# Patient Record
Sex: MALE | Race: WHITE | Employment: FULL TIME | URBAN - NONMETROPOLITAN AREA
[De-identification: names, ages, dates, MRNs, and addresses within clinical notes are randomized per-mention and may not be internally consistent; named-entity substitution may affect disease eponyms.]

---

## 2023-01-12 ENCOUNTER — OFFICE VISIT (OUTPATIENT)
Dept: FAMILY MEDICINE CLINIC | Age: 38
End: 2023-01-12
Payer: COMMERCIAL

## 2023-01-12 ENCOUNTER — NURSE TRIAGE (OUTPATIENT)
Dept: OTHER | Facility: CLINIC | Age: 38
End: 2023-01-12

## 2023-01-12 ENCOUNTER — TELEPHONE (OUTPATIENT)
Dept: FAMILY MEDICINE CLINIC | Age: 38
End: 2023-01-12

## 2023-01-12 VITALS
BODY MASS INDEX: 35.83 KG/M2 | HEART RATE: 92 BPM | DIASTOLIC BLOOD PRESSURE: 72 MMHG | HEIGHT: 74 IN | TEMPERATURE: 98.9 F | OXYGEN SATURATION: 96 % | WEIGHT: 279.2 LBS | SYSTOLIC BLOOD PRESSURE: 130 MMHG | RESPIRATION RATE: 16 BRPM

## 2023-01-12 DIAGNOSIS — F17.200 SMOKES: ICD-10-CM

## 2023-01-12 DIAGNOSIS — Z00.00 ENCOUNTER FOR MEDICAL EXAMINATION TO ESTABLISH CARE: Primary | ICD-10-CM

## 2023-01-12 DIAGNOSIS — G56.03 BILATERAL CARPAL TUNNEL SYNDROME: ICD-10-CM

## 2023-01-12 DIAGNOSIS — R20.0 NUMBNESS OF FINGERS OF BOTH HANDS: ICD-10-CM

## 2023-01-12 DIAGNOSIS — R20.0 NUMBNESS OF TOES: ICD-10-CM

## 2023-01-12 PROCEDURE — 99203 OFFICE O/P NEW LOW 30 MIN: CPT | Performed by: NURSE PRACTITIONER

## 2023-01-12 PROCEDURE — G8484 FLU IMMUNIZE NO ADMIN: HCPCS | Performed by: NURSE PRACTITIONER

## 2023-01-12 PROCEDURE — G8417 CALC BMI ABV UP PARAM F/U: HCPCS | Performed by: NURSE PRACTITIONER

## 2023-01-12 PROCEDURE — 1036F TOBACCO NON-USER: CPT | Performed by: NURSE PRACTITIONER

## 2023-01-12 PROCEDURE — G8427 DOCREV CUR MEDS BY ELIG CLIN: HCPCS | Performed by: NURSE PRACTITIONER

## 2023-01-12 RX ORDER — NAPROXEN 375 MG/1
375 TABLET ORAL 2 TIMES DAILY WITH MEALS
Qty: 180 TABLET | Refills: 1 | Status: SHIPPED | OUTPATIENT
Start: 2023-01-12

## 2023-01-12 SDOH — ECONOMIC STABILITY: FOOD INSECURITY: WITHIN THE PAST 12 MONTHS, THE FOOD YOU BOUGHT JUST DIDN'T LAST AND YOU DIDN'T HAVE MONEY TO GET MORE.: NEVER TRUE

## 2023-01-12 SDOH — ECONOMIC STABILITY: FOOD INSECURITY: WITHIN THE PAST 12 MONTHS, YOU WORRIED THAT YOUR FOOD WOULD RUN OUT BEFORE YOU GOT MONEY TO BUY MORE.: NEVER TRUE

## 2023-01-12 ASSESSMENT — PATIENT HEALTH QUESTIONNAIRE - PHQ9
2. FEELING DOWN, DEPRESSED OR HOPELESS: 0
SUM OF ALL RESPONSES TO PHQ QUESTIONS 1-9: 0
SUM OF ALL RESPONSES TO PHQ9 QUESTIONS 1 & 2: 0
DEPRESSION UNABLE TO ASSESS: FUNCTIONAL CAPACITY MOTIVATION LIMITS ACCURACY
1. LITTLE INTEREST OR PLEASURE IN DOING THINGS: 0

## 2023-01-12 ASSESSMENT — SOCIAL DETERMINANTS OF HEALTH (SDOH): HOW HARD IS IT FOR YOU TO PAY FOR THE VERY BASICS LIKE FOOD, HOUSING, MEDICAL CARE, AND HEATING?: NOT HARD AT ALL

## 2023-01-12 ASSESSMENT — ENCOUNTER SYMPTOMS
GASTROINTESTINAL NEGATIVE: 1
RESPIRATORY NEGATIVE: 1

## 2023-01-12 NOTE — TELEPHONE ENCOUNTER
Location of patient: [facility]    Received call from [facility]; Patient with Red Flag Complaint requesting to establish care with Dr Ella Roe. Subjective: Caller states \"Sharp pains in fingers/toes, intermittent tingling in finger tips\"     Current Symptoms: pain in fingers/ toes and tingling in fingertips,, denies numbness    intermittent pin prick like pain in fingers/toes    Can be worsened when laying down or sitting. Not as bad when up walking around. Upper abdominal and chest pain(right side)    Onset: 1 week ago; intermittent    Associated Symptoms: increased wakefulness    Pain Severity: 1/10; sharp, tingling; intermittent    Temperature: n/a     What has been tried: changed diet, walking more    LMP: NA Pregnant: NA    Recommended disposition: See in Office Today     Walk in clinic    Care advice provided, patient verbalizes understanding; denies any other questions or concerns; instructed to call back for any new or worsening symptoms. Patient/Caller agrees with recommended disposition; writer provided warm transfer to [facility] at [facility] for appointment scheduling    Attention Provider: Thank you for allowing me to participate in the care of your patient. The patient was connected to triage in response to information provided to the [facility]. Please do not respond through this encounter as the response is not directed to a shared pool.       Reason for Disposition   Patient wants to be seen    Protocols used: Neurologic Deficit-ADULT-[place]

## 2023-01-12 NOTE — TELEPHONE ENCOUNTER
Spoke with pt who says he wanted to be seen today \"urgently\" and we did not have any openings. So they scheduled pt with Dexter Arevalo CNP at [facility] since she had an opening. Pt would like to be a NP of yours. Please advise.

## 2023-01-12 NOTE — TELEPHONE ENCOUNTER
----- Message from [place] sent at 1/12/2023  9:15 AM EST -----  Subject: Message to Provider    QUESTIONS  Information for Provider? new pt, establish care, screened green. pt would   like callback to schedule appt to establish care jenniffer Palma.   ---------------------------------------------------------------------------  --------------  [id number]  [phone]; OK to leave message on voicemail  ---------------------------------------------------------------------------  --------------  SCRIPT ANSWERS  Relationship to Patient?  Self

## 2023-01-12 NOTE — PROGRESS NOTES
[facility]  [address] [place] [address]  Dept: [phone]  Dept Fax: [phone]  Loc: [facility]os is a 30 y.o. Michaelwyatt Ratel presents today for his medical conditions/complaints as noted below. Marissa Augustine c/o of New Patient (To get established. Tingling sensation in fingers and toes started Friday )      HPI:      Pt here to get established. Pt denies any chronic illness. Pt states in the last few weeks he has been having some pain \"shocks\" and numbness in the tips of fingers and toes. He denies any hand or foot swelling or redness or pain. Denies a weak hand grasp. Has never been diagnosed with T2DM. He does work in a lab daily. Pt does smoke, has not had labs in a few years. Arnoldo frequent H/A or dizziness or vision changes. Current Outpatient Medications   Medication Sig Dispense Refill    naproxen (NAPROSYN) 375 MG tablet Take 1 tablet by mouth 2 times daily (with meals) 180 tablet 1     No current facility-administered medications for this visit. History reviewed. No pertinent past medical history. History reviewed. No pertinent surgical history. History reviewed. No pertinent family history.   Social History     Tobacco Use    Smoking status: Former     Types: Cigarettes     Passive exposure: Never    Smokeless tobacco: Never   Substance Use Topics    Alcohol use: Not on file        No Known Allergies    Health Maintenance   Topic Date Due    Varicella vaccine (1 of 2 - 2-dose childhood series) Never done    Depression Screen  Never done    HIV screen  Never done    Hepatitis C screen  Never done    DTaP/Tdap/Td vaccine (1 - Tdap) Never done    COVID-19 Vaccine (3 - Booster for Pfizer series) 09/08/2021    Diabetes screen  Never done    Flu vaccine (1) Never done    Hepatitis A vaccine  Aged Out    Hib vaccine  Aged Out    Meningococcal (ACWY) vaccine  Aged Out    Pneumococcal 0-64 years Vaccine  Aged Out       Subjective:      Review of Systems   Constitutional:  Negative for chills, fatigue and fever. HENT: Negative. Respiratory: Negative. Cardiovascular: Negative. Gastrointestinal: Negative. Genitourinary:  Negative for difficulty urinating and dysuria. Musculoskeletal:  Negative for arthralgias and myalgias. Skin: Negative. Neurological:  Positive for numbness. Negative for dizziness, tremors, syncope, facial asymmetry, weakness, light-headedness and headaches. Psychiatric/Behavioral:  Negative for self-injury, sleep disturbance and suicidal ideas. The patient is not nervous/anxious. Objective:      /72   Pulse 92   Temp 98.9 °F (37.2 °C)   Resp 16   Ht 6' 2.02\" (1.88 m)   Wt 279 lb 3.2 oz (126.6 kg)   SpO2 96%   BMI 35.83 kg/m²      Physical Exam  Vitals and nursing note reviewed. Constitutional:       Appearance: He is not ill-appearing. HENT:      Right Ear: Tympanic membrane, ear canal and external ear normal.      Left Ear: Tympanic membrane, ear canal and external ear normal.      Nose: Nose normal.   Cardiovascular:      Rate and Rhythm: Normal rate and regular rhythm. Pulses: Normal pulses. Heart sounds: Normal heart sounds. No murmur heard. Pulmonary:      Effort: Pulmonary effort is normal. No respiratory distress. Breath sounds: Normal breath sounds. No wheezing. Abdominal:      General: Abdomen is flat. Bowel sounds are normal. There is no distension. Palpations: Abdomen is soft. Skin:     General: Skin is warm and dry. Capillary Refill: Capillary refill takes less than 2 seconds. Neurological:      General: No focal deficit present. Mental Status: He is alert and oriented to person, place, and time. Cranial Nerves: Cranial nerves 2-12 are intact. Sensory: Sensation is intact. Motor: Motor function is intact. Coordination: Coordination is intact. Gait: Gait is intact. Comments: + tinnels bilaterally    No bony joint deformity of fingers, no erythema or edema  Sensation intact     Bilateral hand grasp strong    Sensation intact to both feet     BLE DP and PT pulses equal and strong    BUE radial pulses equal and strong     No pedal edema. Psychiatric:         Mood and Affect: Mood normal.         Behavior: Behavior normal.         Thought Content: Thought content normal.         Judgment: Judgment normal.        Assessment/Plan:           1. Encounter for medical examination to establish care    - CBC with Auto Differential; Future  - Basic Metabolic Panel; Future  - TSH With Reflex Ft4; Future  - Ferritin; Future  - Vitamin B12 & Folate; Future  - Vitamin B7; Future  - Vitamin B6; Future  - Vitamin B1; Future    2. Numbness of fingers of both hands  R/o vitamin B deficiency vs ferritin abnormality due to smoking  and T2DM  - CBC with Auto Differential; Future  - Basic Metabolic Panel; Future  - TSH With Reflex Ft4; Future  - Ferritin; Future  - Vitamin B12 & Folate; Future  - Vitamin B7; Future  - Vitamin B6; Future  - Vitamin B1; Future    3. Numbness of toes  #3  - CBC with Auto Differential; Future  - Basic Metabolic Panel; Future  - TSH With Reflex Ft4; Future  - Ferritin; Future  - Vitamin B12 & Folate; Future  - Vitamin B7; Future  - Vitamin B6; Future  - Vitamin B1; Future    4. Smokes  Declines cessation   - CBC with Auto Differential; Future  - Basic Metabolic Panel; Future  - TSH With Reflex Ft4; Future  - Ferritin; Future  - Vitamin B12 & Folate; Future  - Vitamin B7; Future  - Vitamin B6; Future  - Vitamin B1; Future    5. Bilateral carpal tunnel syndrome  Naprosyn  Declines wrist splints  Carpal tunnel exercises given to pt     Pt in agreement with plan       No follow-ups on file.     Reccommended tobaccocessation options including pharmacologic methods, counseled great than 3 minutesduring this visit:  Yes[]  No  []       Patient given educational materials -see patient instructions. Discussed use, benefit, and side effects of prescribedmedications. All patient questions answered. Pt voiced understanding. Reviewedhealth maintenance. Instructed to continue current medications, diet and exercise. Patient agreed with treatment plan. Follow up as directed.        Electronicallysigned by KLAUDIA Hartman CNP on 1/12/2023 at 10:57 AM

## 2023-01-13 ENCOUNTER — TELEPHONE (OUTPATIENT)
Dept: FAMILY MEDICINE CLINIC | Age: 38
End: 2023-01-13

## 2023-01-13 NOTE — TELEPHONE ENCOUNTER
Just to inform you    Patient calling to report that he had blood work done today that was ordered by Misha and that he will be a patient of Dr Lilian Huang moving forward.   Patient would like for his lab results forwarded to Dr Lilian Huang once they have been resulted   I did inform the patient that Dr Lilian Huang would be able to view the results in EP[facility] since both providers are within the same system

## 2023-01-18 ENCOUNTER — OFFICE VISIT (OUTPATIENT)
Dept: FAMILY MEDICINE CLINIC | Age: 38
End: 2023-01-18
Payer: COMMERCIAL

## 2023-01-18 VITALS
HEIGHT: 75 IN | DIASTOLIC BLOOD PRESSURE: 80 MMHG | BODY MASS INDEX: 35.11 KG/M2 | HEART RATE: 56 BPM | SYSTOLIC BLOOD PRESSURE: 112 MMHG | RESPIRATION RATE: 16 BRPM | WEIGHT: 282.4 LBS

## 2023-01-18 DIAGNOSIS — R20.0 NUMBNESS OF TOES: ICD-10-CM

## 2023-01-18 DIAGNOSIS — E66.9 OBESITY (BMI 30-39.9): ICD-10-CM

## 2023-01-18 DIAGNOSIS — R20.0 NUMBNESS OF FINGERS OF BOTH HANDS: Primary | ICD-10-CM

## 2023-01-18 PROCEDURE — 99213 OFFICE O/P EST LOW 20 MIN: CPT | Performed by: FAMILY MEDICINE

## 2023-01-18 PROCEDURE — G8484 FLU IMMUNIZE NO ADMIN: HCPCS | Performed by: FAMILY MEDICINE

## 2023-01-18 PROCEDURE — G8417 CALC BMI ABV UP PARAM F/U: HCPCS | Performed by: FAMILY MEDICINE

## 2023-01-18 PROCEDURE — 1036F TOBACCO NON-USER: CPT | Performed by: FAMILY MEDICINE

## 2023-01-18 PROCEDURE — G8427 DOCREV CUR MEDS BY ELIG CLIN: HCPCS | Performed by: FAMILY MEDICINE

## 2023-01-18 ASSESSMENT — ENCOUNTER SYMPTOMS
GASTROINTESTINAL NEGATIVE: 1
RESPIRATORY NEGATIVE: 1

## 2023-01-18 NOTE — PROGRESS NOTES
Phoned [facility] Pathology, spoke with Belkis Andrade who will fax over recent lab results.

## 2023-01-18 NOTE — PROGRESS NOTES
2023    Dewayne Sun (:  1985) is a 30 y.o. male, here for a preventive medicine evaluation. Chief Complaint   Patient presents with    New Patient     No previous PCP, although pt saw Janny Lazcano CNP recently for 1 visit     NP to establish. Last PCP Dr. Mechelle Reyes. Pt was recently seen by Dr. Mechelle Reyes for numbness in the tips of his fingers and bilateral feet. Left big toe has tingling and sharp \"pin pricks\" on a regular basis. This just started last week. No weakness appreciated. No change in strength, gait, etc.      BP Readings from Last 3 Encounters:   23 112/80   23 130/72     Wt Readings from Last 3 Encounters:   23 282 lb 6.4 oz (128.1 kg)   23 279 lb 3.2 oz (126.6 kg)       Patient Active Problem List   Diagnosis    Smokes       Review of Systems   Constitutional: Negative. HENT: Negative. Respiratory: Negative. Cardiovascular: Negative. Gastrointestinal: Negative. Musculoskeletal: Negative. Neurological:  Positive for numbness (fingers, feet). Negative for weakness. All other systems reviewed and are negative. Prior to Visit Medications    Medication Sig Taking? Authorizing Provider   naproxen (NAPROSYN) 375 MG tablet Take 1 tablet by mouth 2 times daily (with meals) Yes KLAUDIA Allison CNP        No Known Allergies    No past medical history on file. No past surgical history on file.     Social History     Socioeconomic History    Marital status: Single     Spouse name: Not on file    Number of children: Not on file    Years of education: Not on file    Highest education level: Not on file   Occupational History    Not on file   Tobacco Use    Smoking status: Former     Packs/day: 0.75     Years: 13.00     Pack years: 9.75     Types: Cigarettes     Start date: 6/15/2006     Quit date: 6/15/2019     Years since quitting: 3.5     Passive exposure: Never    Smokeless tobacco: Never   Vaping Use    Vaping Use: Former    Start date: 6/15/2019    Quit date: 9/15/2019    Substances: Nicotine   Substance and Sexual Activity    Alcohol use: Yes     Alcohol/week: 1.0 standard drink     Types: 1 Cans of beer per week    Drug use: Yes     Types: Marijuana Dauna Other)    Sexual activity: Not on file   Other Topics Concern    Not on file   Social History Narrative    Not on file     Social Determinants of Health     Financial Resource Strain: Low Risk     Difficulty of Paying Living Expenses: Not hard at all   Food Insecurity: No Food Insecurity    Worried About Running Out of Food in the Last Year: Never true    Ran Out of Food in the Last Year: Never true   Transportation Needs: Not on file   Physical Activity: Not on file   Stress: Not on file   Social Connections: Not on file   Intimate Partner Violence: Not on file   Housing Stability: Not on file        Family History   Problem Relation Age of Onset    Atrial Fibrillation Mother     Kidney Disease Father     High Blood Pressure Father     No Known Problems Brother     Kidney Disease Maternal Aunt     Parkinsonism Maternal Grandmother     No Known Problems Maternal Grandfather     Cancer Paternal Grandmother         Colon, pancreatic       ADVANCE DIRECTIVE: N, <no information>    Vitals:    01/18/23 0810   BP: 112/80   Site: Left Upper Arm   Position: Sitting   Cuff Size: Large Adult   Pulse: 56   Resp: 16   Weight: 282 lb 6.4 oz (128.1 kg)   Height: 6' 3.25\" (1.911 m)     Estimated body mass index is 35.06 kg/m² as calculated from the following:    Height as of this encounter: 6' 3.25\" (1.911 m). Weight as of this encounter: 282 lb 6.4 oz (128.1 kg). Physical Exam  Vitals and nursing note reviewed. Constitutional:       General: He is not in acute distress. Appearance: Normal appearance. He is well-developed. HENT:      Head: Normocephalic and atraumatic.       Right Ear: Tympanic membrane normal.      Left Ear: Tympanic membrane normal.   Eyes: Conjunctiva/sclera: Conjunctivae normal.   Cardiovascular:      Rate and Rhythm: Normal rate and regular rhythm. Heart sounds: Normal heart sounds. No murmur heard. Pulmonary:      Effort: Pulmonary effort is normal.      Breath sounds: Normal breath sounds. No wheezing, rhonchi or rales. Abdominal:      General: There is no distension. Musculoskeletal:      Cervical back: Neck supple. Skin:     General: Skin is warm and dry. Findings: No rash (on exposed surfaces). Neurological:      General: No focal deficit present. Mental Status: He is alert. Sensory: Sensation is intact. Motor: Motor function is intact. Psychiatric:         Attention and Perception: Attention normal.         Mood and Affect: Mood normal.         Speech: Speech normal.         Behavior: Behavior normal. Behavior is cooperative. Thought Content: Thought content normal.         Judgment: Judgment normal.       No flowsheet data found. No results found for: CHOL, CHOLFAST, TRIG, TRIGLYCFAST, HDL, LDLCHOLESTEROL, LDLCALC, GLUF, GLUCOSE, LABA1C    The ASCVD Risk score (Miranda MOLINA, et al., 2019) failed to calculate for the following reasons:     The 2019 ASCVD risk score is only valid for ages 36 to 78    Immunization History   Administered Date(s) Administered    COVID-19, PFIZER PURPLE top, DILUTE for use, (age 15 y+), 30mcg/0.3mL 06/21/2021, 07/14/2021       Health Maintenance   Topic Date Due    Varicella vaccine (1 of 2 - 2-dose childhood series) Never done    HIV screen  Never done    Hepatitis C screen  Never done    DTaP/Tdap/Td vaccine (1 - Tdap) Never done    COVID-19 Vaccine (3 - Booster for Pfizer series) 09/08/2021    Diabetes screen  Never done    Flu vaccine (1) Never done    Depression Screen  01/12/2024    Hepatitis A vaccine  Aged Out    Hib vaccine  Aged Out    Meningococcal (ACWY) vaccine  Aged Out    Pneumococcal 0-64 years Vaccine  Aged Out       Assessment & Plan   Numbness of fingers of both hands  Numbness of toes  Obesity (BMI 30-39.9)    -  PMHx reviewed  -  Call for lab results  -  Continue naproxen    Return if symptoms worsen or fail to improve. Message office in 2 weeks with update, plan EMG if no improvement.          --Breann Forrest, DO

## 2023-01-26 ENCOUNTER — TELEPHONE (OUTPATIENT)
Dept: FAMILY MEDICINE CLINIC | Age: 38
End: 2023-01-26

## 2023-01-26 NOTE — TELEPHONE ENCOUNTER
----- Message from KLAUDIA Siddiqui - CNP sent at 1/25/2023  1:23 PM EST -----  Let pt know labs are back and all are in normal range other then his B 1 which is 2 and normal range is 4-15, I recommend he take a  daily MVI and I will forward these values on to Dr. Brad Alberts.